# Patient Record
Sex: MALE | Race: OTHER | HISPANIC OR LATINO | URBAN - METROPOLITAN AREA
[De-identification: names, ages, dates, MRNs, and addresses within clinical notes are randomized per-mention and may not be internally consistent; named-entity substitution may affect disease eponyms.]

---

## 2023-04-27 ENCOUNTER — EMERGENCY (EMERGENCY)
Facility: HOSPITAL | Age: 28
LOS: 1 days | Discharge: HOME | End: 2023-04-27
Admitting: EMERGENCY MEDICINE
Payer: OTHER MISCELLANEOUS

## 2023-04-27 VITALS
DIASTOLIC BLOOD PRESSURE: 74 MMHG | HEART RATE: 91 BPM | TEMPERATURE: 98 F | OXYGEN SATURATION: 96 % | SYSTOLIC BLOOD PRESSURE: 120 MMHG | WEIGHT: 240.3 LBS | RESPIRATION RATE: 18 BRPM

## 2023-04-27 PROCEDURE — 99284 EMERGENCY DEPT VISIT MOD MDM: CPT

## 2023-04-27 RX ORDER — IBUPROFEN 200 MG
600 TABLET ORAL ONCE
Refills: 0 | Status: COMPLETED | OUTPATIENT
Start: 2023-04-27 | End: 2023-04-27

## 2023-04-27 RX ADMIN — Medication 600 MILLIGRAM(S): at 19:33
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
## 2023-04-27 NOTE — ED PROVIDER NOTE - SKIN, MLM
Skin normal color for race, warm, dry and intact. No evidence of rash,   two small red marks noted on left 4th and 3rd dorsal aspect
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
## 2023-04-27 NOTE — ED PROVIDER NOTE - NSFOLLOWUPINSTRUCTIONS_ED_ALL_ED_FT
Please place bacitracin on your wound one a day    Keep clean and dry      You may take ibuprofen 600 mg every six to eight hours with food for pain. You may also take tylenol 650 mg every six hours for pain, do not exceed 3000 mg daily of tylenol aka acetomenophen. It is safe to take these medications together.        if you notice worsening pain, swelling, fever, discharge return to the ed

## 2023-04-27 NOTE — ED PROVIDER NOTE - PATIENT PORTAL LINK FT
You can access the FollowMyHealth Patient Portal offered by [facility] by registering at the following website: [email or web address]. By joining [facility]’s FollowMyHealth portal, you will also be able to view your health information using other applications (apps) compatible with our system.
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
## 2023-04-29 DIAGNOSIS — T23.132A BURN OF FIRST DEGREE OF MULTIPLE LEFT FINGERS (NAIL), NOT INCLUDING THUMB, INITIAL ENCOUNTER: ICD-10-CM
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
